# Patient Record
Sex: FEMALE | Race: WHITE | NOT HISPANIC OR LATINO | URBAN - METROPOLITAN AREA
[De-identification: names, ages, dates, MRNs, and addresses within clinical notes are randomized per-mention and may not be internally consistent; named-entity substitution may affect disease eponyms.]

---

## 2017-11-08 ENCOUNTER — AMBULATORY - RIVER FALLS (OUTPATIENT)
Dept: FAMILY MEDICINE | Facility: CLINIC | Age: 82
End: 2017-11-08

## 2017-12-06 ENCOUNTER — AMBULATORY - RIVER FALLS (OUTPATIENT)
Dept: FAMILY MEDICINE | Facility: CLINIC | Age: 82
End: 2017-12-06

## 2017-12-07 LAB
CHOLEST SERPL-MCNC: 215 MG/DL
CHOLEST/HDLC SERPL: 3.7 {RATIO}
CREAT SERPL-MCNC: 1.06 MG/DL (ref 0.6–0.88)
GLUCOSE BLD-MCNC: 93 MG/DL (ref 65–99)
HDLC SERPL-MCNC: 58 MG/DL
LDLC SERPL CALC-MCNC: 131 MG/DL
NONHDLC SERPL-MCNC: 157 MG/DL
TRIGL SERPL-MCNC: 138 MG/DL

## 2017-12-15 ENCOUNTER — OFFICE VISIT - RIVER FALLS (OUTPATIENT)
Dept: FAMILY MEDICINE | Facility: CLINIC | Age: 82
End: 2017-12-15

## 2017-12-15 ASSESSMENT — MIFFLIN-ST. JEOR: SCORE: 902.1

## 2018-10-22 ENCOUNTER — AMBULATORY - RIVER FALLS (OUTPATIENT)
Dept: FAMILY MEDICINE | Facility: CLINIC | Age: 83
End: 2018-10-22

## 2019-01-01 ENCOUNTER — AMBULATORY - RIVER FALLS (OUTPATIENT)
Dept: FAMILY MEDICINE | Facility: CLINIC | Age: 84
End: 2019-01-01

## 2019-01-03 ENCOUNTER — AMBULATORY - RIVER FALLS (OUTPATIENT)
Dept: FAMILY MEDICINE | Facility: CLINIC | Age: 84
End: 2019-01-03

## 2019-01-04 LAB
BUN SERPL-MCNC: 25 MG/DL (ref 7–25)
BUN/CREAT RATIO - HISTORICAL: 23 (ref 6–22)
CALCIUM SERPL-MCNC: 8.9 MG/DL (ref 8.6–10.4)
CHLORIDE BLD-SCNC: 103 MMOL/L (ref 98–110)
CHOLEST SERPL-MCNC: 212 MG/DL
CHOLEST/HDLC SERPL: 3.3 {RATIO}
CO2 SERPL-SCNC: 28 MMOL/L (ref 20–32)
CREAT SERPL-MCNC: 1.09 MG/DL (ref 0.6–0.88)
EGFRCR SERPLBLD CKD-EPI 2021: 44 ML/MIN/1.73M2
GLUCOSE BLD-MCNC: 87 MG/DL (ref 65–99)
HDLC SERPL-MCNC: 65 MG/DL
LDLC SERPL CALC-MCNC: 127 MG/DL
NONHDLC SERPL-MCNC: 147 MG/DL
POTASSIUM BLD-SCNC: 4.1 MMOL/L (ref 3.5–5.3)
SODIUM SERPL-SCNC: 143 MMOL/L (ref 135–146)
TRIGL SERPL-MCNC: 97 MG/DL
TSH SERPL DL<=0.005 MIU/L-ACNC: 2.07 MIU/L (ref 0.4–4.5)

## 2019-03-18 ENCOUNTER — OFFICE VISIT - RIVER FALLS (OUTPATIENT)
Dept: FAMILY MEDICINE | Facility: CLINIC | Age: 84
End: 2019-03-18

## 2019-03-18 ENCOUNTER — TRANSFERRED RECORDS (OUTPATIENT)
Dept: MULTI SPECIALTY CLINIC | Facility: CLINIC | Age: 84
End: 2019-03-18

## 2019-03-18 ASSESSMENT — MIFFLIN-ST. JEOR: SCORE: 865.81

## 2020-01-01 ENCOUNTER — OFFICE VISIT - RIVER FALLS (OUTPATIENT)
Dept: FAMILY MEDICINE | Facility: CLINIC | Age: 85
End: 2020-01-01

## 2020-01-01 ENCOUNTER — COMMUNICATION - RIVER FALLS (OUTPATIENT)
Dept: FAMILY MEDICINE | Facility: CLINIC | Age: 85
End: 2020-01-01

## 2022-02-12 VITALS
SYSTOLIC BLOOD PRESSURE: 130 MMHG | HEART RATE: 71 BPM | WEIGHT: 121 LBS | HEIGHT: 62 IN | DIASTOLIC BLOOD PRESSURE: 64 MMHG | BODY MASS INDEX: 22.26 KG/M2

## 2022-02-12 VITALS
SYSTOLIC BLOOD PRESSURE: 120 MMHG | WEIGHT: 113 LBS | HEIGHT: 62 IN | TEMPERATURE: 98.8 F | HEART RATE: 76 BPM | DIASTOLIC BLOOD PRESSURE: 62 MMHG | BODY MASS INDEX: 20.8 KG/M2

## 2022-02-16 NOTE — LETTER
(Inserted Image. Unable to display)   January 04, 2019      MAURICIO VERMA      [address]    [place], [place] [id number]        Dear MAURICIO,    Thank you for selecting [facility] (previously [facility]) for your healthcare needs.  Below you will find the results of your recent tests done at our clinic.    Results are acceptable.      Result Name Current Result Previous Result Reference Range   Sodium Level (mmol/L)  143 1/3/2019  142 12/6/2017 135 - 146   Potassium Level (mmol/L)  4.1 1/3/2019  4.1 12/6/2017 3.5 - 5.3   Chloride Level (mmol/L)  103 1/3/2019  103 12/6/2017 98 - 110   CO2 Level (mmol/L)  28 1/3/2019  30 12/6/2017 20 - 32   Glucose Level (mg/dL)  87 1/3/2019  93 12/6/2017 65 - 99   BUN (mg/dL)  25 1/3/2019  24 12/6/2017 7 - 25   Creatinine Level (mg/dL) ((H)) 1.09 1/3/2019 ((H)) 1.06 12/6/2017 0.60 - 0.88   eGFR (mL/min/1.73m2) ((L)) 44 1/3/2019 ((L)) 46 12/6/2017 > OR = 60 -    Calcium Level (mg/dL)  8.9 1/3/2019  8.9 12/6/2017 8.6 - 10.4   Cholesterol (mg/dL) ((H)) 212 1/3/2019 ((H)) 215 12/6/2017  - <200   Non-HDL Cholesterol ((H)) 147 1/3/2019 ((H)) 157 12/6/2017  - <130   HDL (mg/dL)  65 1/3/2019  58 12/6/2017 >50 -    Cholesterol/HDL Ratio  3.3 1/3/2019  3.7 12/6/2017  - <5.0   LDL ((H)) 127 1/3/2019 ((H)) 131 12/6/2017    Triglyceride (mg/dL)  97 1/3/2019  138 12/6/2017  - <150   TSH (mIU/L)  2.07 1/3/2019 ((H)) 4.53 12/6/2017 0.40 - 4.50       Please contact me or my assistant at [phone] if you have any questions.     Sincerely,        Bala Bob MD

## 2022-02-16 NOTE — LETTER
(Inserted Image. Unable to display)     July 31, 2020      MAURICIO VERMA  [address]    [place], [place] [id number]          Dear MAURICIO,      Thank you for selecting [facility] (previously [facility]) for your healthcare needs.    Our records indicate you are due for the following services:    Medicare Annual Wellness Visit.    Fasting Lab Tests ~ Please do not eat or drink anything 10 hours prior to your scheduled appointment time.  (Water and any medications that you may need are allowed unless directed otherwise.)    If you had your labs done at another facility or with Direct Access Lab Testing at [facility], please bring in a copy of the results to your next visit, mail a copy, or drop off a copy of your results to your Healthcare Provider.    You are due for lab work and an office visit; please schedule the lab appointment 1 week before the office visit.  This will assure all results are available to discuss with your Healthcare Provider during your visit.    **It is very helpful if you bring your medication bottles to your appointment.  This assures we have all of your current medications, including strength and dosing information, documented accurately in your medical record.    To schedule an appointment or if you have further questions, please contact your primary clinic:   [facility]       [phone]   Critical access hospital       [phone]              [facility]     [phone]      Powered by [employer]    Sincerely,    Bala Bob MD, FACP

## 2022-02-16 NOTE — TELEPHONE ENCOUNTER
Entered by Kimberly Dey LPN on March 08, 2019 11:55:37 AM CST  ---------------------  From: Kimberly Dey LPN   To: [facility]    Sent: 3/8/2019 11:55:37 AM CST  Subject: Medication Management     ** Submitted: **  Order:amLODIPine (amLODIPine 5 mg oral tablet)  1 tab(s)  Oral  daily  Qty:  14 tab(s)        Refills:  0          Substitutions Allowed     Route To Pharmacy - [facility]    Signed by Kimberly Dey LPN  3/8/2019 11:55:00 AM    ** Submitted: **  Complete:amLODIPine (amLODIPine 5 mg oral tablet)   Signed by Kimberly Dey LPN  3/8/2019 11:55:00 AM    ** Not Approved:  **  amLODIPine (AMLODIPINE 5MG TABLET)  TAKE 1 TABLET BY MOUTH ONCE DAILY -MD APPT NEEDED FOR FURTHER REFILLS 1/30/19  Qty:  30 unknown unit        Days Supply:  0        Refills:  0          Substitutions Allowed     Route To Pharmacy - [facility]   Signed by Kimberly Dey LPN            ** Submitted: **  Order:atenolol (atenolol 100 mg oral tablet)  1 tab(s)  Oral  daily  Qty:  14 tab(s)        Refills:  0          Substitutions Allowed     Route To Pharmacy - [facility]    Signed by Kimberly Dey LPN  3/8/2019 11:54:00 AM    ** Submitted: **  Complete:atenolol (atenolol 100 mg oral tablet)   Signed by Kimberly Dey LPN  3/8/2019 11:55:00 AM    ** Not Approved:  **  atenolol (ATENOLOL     TAB 100MG TABLET)  TAKE 1 TABLET BY MOUTH ONCE DAILY -MD APPT NEEDED FOR FURTHER REFILLS 1/30/19  Qty:  30 unknown unit        Days Supply:  0        Refills:  0          Substitutions Allowed     Route To Pharmacy - [facility]   Signed by Kimberly Dey LPN            ** Submitted: **  Order:levothyroxine (levothyroxine 88 mcg (0.088 mg) oral tablet)  1 tab(s)  Oral  daily  Qty:  14 tab(s)        Refills:  0          Substitutions Allowed     Route To Pharmacy - [facility]    Signed by Kimberly Dey LPN  3/8/2019 11:54:00 AM    ** Submitted: **  Complete:levothyroxine (levothyroxine 88 mcg (0.088 mg) oral tablet)   Signed by Kimberly Dey LPN  3/8/2019  11:54:00 AM    ** Not Approved:  **  levothyroxine (LEVOTHYROXINE TAB 88MCG TABLET)  TAKE 1 TABLET BY MOUTH ONCE DAILY -MD APPT NEEDED FOR FURTHER REFILLS 1/30/19  Qty:  30 unknown unit        Days Supply:  0        Refills:  0          Substitutions Allowed     Route To Pharmacy - [facility]   Signed by Kimberly Dey LPN            ** Submitted: **  Order:hydrochlorothiazide-losartan (hydrochlorothiazide-losartan 25 mg-100 mg oral tablet)  1 tab(s)  Oral  daily  Qty:  14 tab(s)        Refills:  0          Substitutions Allowed     Route To Pharmacy - [facility]    Signed by Kimberly Dey LPN  3/8/2019 11:53:00 AM    ** Submitted: **  Complete:hydrochlorothiazide-losartan (hydrochlorothiazide-losartan 25 mg-100 mg oral tablet)   Signed by Kimberly Dey LPN  3/8/2019 11:53:00 AM    ** Not Approved:  **  hydrochlorothiazide-losartan (LOSARTAN/HCTZ 100MG/25MG /25 TABLET)  TAKE 1 TABLET BY MOUTH ONCE DAILY -MD APPT NEEDED FOR FURTHER REFILLS 1/30/19  Qty:  30 unknown unit        Days Supply:  0        Refills:  0          Substitutions Allowed     Route To Pharmacy - [facility]   Signed by Kimberly Dey LPN          Received VM from [employer] at 11:38am. They said pt is having trouble getting her refills and they have been sending requests. They ask that we call pt at [phone] if she is needing an appt.    Called pt and let her know that she needs to make appt to see [facility]. Pt just had fasting labs done in January. Pt ok with being transferred to scheduling to make appt.    Pt informed 2 week supply will be sent in to get her through until that appt.      ** Patient matched by Kimberly Dey LPN on 3/8/2019 11:48:51 AM CST **      ------------------------------------------  From: [facility]  To: Bala Bob MD  Sent: March 8, 2019 11:30:22 AM CST  Subject: Medication Management  Due: March 9, 2019 11:30:22 AM CST    ** On Hold Pending Signature **  Drug: hydrochlorothiazide-losartan  (hydrochlorothiazide-losartan 25 mg-100 mg oral tablet)  TAKE 1 TABLET BY MOUTH ONCE DAILY -MD APPT NEEDED FOR FURTHER REFILLS 1/30/19  Quantity: 30 unknown unit  Days Supply: 0         Refills: 0  Substitutions Allowed  Notes from Pharmacy:     Dispensed Drug: hydrochlorothiazide-losartan (hydrochlorothiazide-losartan 25 mg-100 mg oral tablet)  TAKE 1 TABLET BY MOUTH ONCE DAILY -MD APPT NEEDED FOR FURTHER REFILLS 1/30/19  Quantity: 30 unknown unit  Days Supply: 0         Refills: 0  Substitutions Allowed  Notes from Pharmacy:     ** On Hold Pending Signature **  Drug: levothyroxine (levothyroxine 88 mcg (0.088 mg) oral tablet)  TAKE 1 TABLET BY MOUTH ONCE DAILY -MD APPT NEEDED FOR FURTHER REFILLS 1/30/19  Quantity: 30 unknown unit  Days Supply: 0         Refills: 0  Substitutions Allowed  Notes from Pharmacy:     Dispensed Drug: levothyroxine (levothyroxine 88 mcg (0.088 mg) oral tablet)  TAKE 1 TABLET BY MOUTH ONCE DAILY -MD APPT NEEDED FOR FURTHER REFILLS 1/30/19  Quantity: 30 unknown unit  Days Supply: 0         Refills: 0  Substitutions Allowed  Notes from Pharmacy:     ** On Hold Pending Signature **  Drug: atenolol (atenolol 100 mg oral tablet)  TAKE 1 TABLET BY MOUTH ONCE DAILY -MD APPT NEEDED FOR FURTHER REFILLS 1/30/19  Quantity: 30 unknown unit  Days Supply: 0         Refills: 0  Substitutions Allowed  Notes from Pharmacy:     Dispensed Drug: atenolol (atenolol 100 mg oral tablet)  TAKE 1 TABLET BY MOUTH ONCE DAILY -MD APPT NEEDED FOR FURTHER REFILLS 1/30/19  Quantity: 30 unknown unit  Days Supply: 0         Refills: 0  Substitutions Allowed  Notes from Pharmacy:     ** On Hold Pending Signature **  Drug: amLODIPine (amLODIPine 5 mg oral tablet)  TAKE 1 TABLET BY MOUTH ONCE DAILY -MD APPT NEEDED FOR FURTHER REFILLS 1/30/19  Quantity: 30 unknown unit  Days Supply: 0         Refills: 0  Substitutions Allowed  Notes from Pharmacy:     Dispensed Drug: amLODIPine (amLODIPine 5 mg oral tablet)  TAKE 1 TABLET BY  MOUTH ONCE DAILY -MD APPT NEEDED FOR FURTHER REFILLS 1/30/19  Quantity: 30 unknown unit  Days Supply: 0         Refills: 0  Substitutions Allowed  Notes from Pharmacy:   ------------------------------------------

## 2022-02-16 NOTE — NURSING NOTE
CAGE Assessment Entered On:  3/18/2019 2:15 PM CDT    Performed On:  3/18/2019 2:15 PM CDT by Ju Mao MA               Assessment   Have you ever felt you should cut down on your drinking :   No   Have people annoyed you by criticizing your drinking :   No   Have you ever felt bad or guilty about your drinking :   No   Have you ever taken a drink first thing in the morning to steady your nerves or get rid of a hangover (Eye-opener) :   No   CAGE Score :   0    Ju Mao MA - 3/18/2019 2:15 PM CDT

## 2022-02-16 NOTE — PROGRESS NOTES
Patient:   MAURICIO VERMA            MRN: [id number]            FIN: [id number]               Age:   89 years     Sex:  Female     :  1927   Associated Diagnoses:   Acquired hypothyroidism; Hip joint pain; HLD (hyperlipidemia); HTN (hypertension); Lower extremity edema; Osteoporosis; Wellness examination   Author:   Bala Bob MD      Visit Information   Visit type:  Annual exam.    Accompanied by:  Family member.    Source of history:  Self.    History limitation:  None.       Chief Complaint   3/18/2019 2:15 PM CDT    AWV      History of Present Illness   The patient is a 89 year old here for a wellness visit.  She lives in her own apartment with her cat and is overall happy with life.  She needs her medications refilled.    On the Medicare Health Risk Assessment form she has not fallen and she does have some rugs and we have discussed this issue.    On complete review she indicates back pain and it is otherwise negative.    GDS short form is 0.    We again discussed osteoporosis and she declines treatment.         Review of Systems   See [facility]       Health Status   Allergies:    Allergic Reactions (Selected)  No known allergies   Medications:  (Selected)   Prescriptions  Prescribed  amLODIPine 5 mg oral tablet: = 1 tab(s), Oral, daily, # 90 tab(s), 3 Refill(s), Type: Maintenance, Pharmacy: [facility], Needs appt, 1 tab(s) Oral daily  atenolol 100 mg oral tablet: = 1 tab(s), Oral, daily, # 90 tab(s), 3 Refill(s), Type: Maintenance, Pharmacy: [facility], Needs appt, 1 tab(s) Oral daily  hydrochlorothiazide-losartan 25 mg-100 mg oral tablet: 1 tab(s), Oral, daily, # 90 tab(s), 3 Refill(s), Type: Maintenance, Pharmacy: [facility], Needs appt, 1 tab(s) Oral daily  levothyroxine 88 mcg (0.088 mg) oral tablet: = 1 tab(s), Oral, daily, # 90 tab(s), 3 Refill(s), Type: Maintenance, Pharmacy: [facility], Needs appt, 1 tab(s) Oral daily  trach-mate tube duarte: trach-mate tube duarte, See Instructions,  Instructions: Daily as needed, Supply, # 100 EA, 3 Refill(s), Type: Maintenance, Pharmacy: [facility], Daily as needed   Problem list:    All Problems  Acquired hypothyroidism / SNOMED CT 574576911 / Confirmed  Breast cancer / SNOMED CT 966227049 / Confirmed  Chronic back pain / SNOMED CT 631526211 / Confirmed  H/O varicose veins / SNOMED CT 988759280 / Confirmed  Hip joint pain / SNOMED CT 52144660 / Confirmed  HLD (hyperlipidemia) / SNOMED CT 80146407 / Confirmed  HTN (hypertension) / SNOMED CT 3874988826 / Confirmed  Hx of colonic polyps / SNOMED CT 2625953275 / Confirmed  Left ventricular dysfunction with reduced left ventricular function / SNOMED CT 040598302 / Confirmed  Lower extremity edema / SNOMED CT 290723655 / Confirmed  Meningioma / SNOMED CT 265555987 / Confirmed  Menopause / SNOMED CT 901283754 / Confirmed  Nodular goiter / SNOMED CT 6246038083 / Confirmed  Osteoporosis / SNOMED CT 235662112 / Confirmed  Paralysis of vocal cords or larynx, unspecified / SNOMED CT 877118512 / Confirmed  RA (rheumatoid arthritis) / SNOMED CT 368835214 / Confirmed  Rhinitis, chronic / SNOMED CT 592642320 / Confirmed  Inactive: Hyperparathyroidism / SNOMED CT 886593454  Canceled: CAD (Coronary Artery Disease) / ICD-9-.00      Histories   Past Medical History:    Active  Breast cancer (321267978): Onset in the month of 10/1994 at 67 years  Meningioma (615993944): Onset in 1980 at 53 years.  RA (rheumatoid arthritis) (802200304)  HTN (hypertension) (4823932143)  Menopause (066214511)  HLD (hyperlipidemia) (64971519)  Osteoporosis (564022860)  Hx of colonic polyps (0498689864)  H/O varicose veins (685473058)  Nodular goiter (5825969745)  Paralysis of vocal cords or larynx, unspecified (760615027)  Chronic back pain (358955445)  Hip joint pain (37493438)  Lower extremity edema (665186294)  Comments:  8/10/2010 CDT 5:09 PM CDT - Hodan Reynaga  chronic  Acquired hypothyroidism (773175631)  Rhinitis, chronic  (303394929)  Left ventricular dysfunction with reduced left ventricular function (220366885)   Family History:    Heart disease  Father ()  Pancreatic cancer  Mother ()  Alcohol abuse  Father ()     Procedure history:    Normal Coronary Angiogram on 2008 at 80 Years.  Tracheostomy (96318243) in the month of 10/2006 at 79 Years.  Comments:  8/10/2010 5:07 PM Hodan Allison  following complications of thyroid surgery and vocal cord paralysis.  Total thyroidectomy (22417027) in  at 78 Years.  Comments:  8/10/2010 5:03 PM Hodan Allison  with excision of left parathyroid adenoma  Colonoscopy (623493186) in  at 73 Years.  Colonoscopy (384571105) in the month of 1998 at 70 Years.  Comments:  8/10/2010 5:01 PM Hodan Allison  with polypectomy - adenomatous  Excision of lesion of breast (7768583198) in  at 67 Years.  Comments:  8/10/2010 5:13 PM Hodan Allison  Left.  Meningioma (98802983) in  at 53 Years.  Appendectomy (598784802) in  at 50 Years.  Comments:  8/10/2010 4:59 PM Hodan Allison  ruptured appendix  Tonsillectomy (307310022) in 1933 at 6 Years.  Mastectomy (6975894499).  Comments:  8/10/2010 5:14 PM Hodan Allison  left   Social History:        Alcohol Assessment            Never      Tobacco Assessment            Never      Employment and Education Assessment            Retired      Home and Environment Assessment            Marital status: .                     Comments:                      2014 - Bala Bob MD                      2014      Physical Examination   Vital Signs   3/18/2019 2:35 PM CDT Systolic Blood Pressure 120 mmHg    Diastolic Blood Pressure 62 mmHg    Mean Arterial Pressure 81 mmHg    BP Site Right arm    BP Method Manual   3/18/2019 2:15 PM CDT Temperature Tympanic 98.8 DegF    Peripheral Pulse Rate 76 bpm    Pulse Site Radial artery    HR Method Manual    Systolic Blood Pressure  151 mmHg  HI    Diastolic Blood Pressure 75 mmHg    Mean Arterial Pressure 100 mmHg    BP Site Right arm    BP Method Manual      Measurements from flowsheet : Measurements   3/18/2019 2:15 PM CDT Height Measured - Standard 62 in    Weight Measured - Standard 113 lb    BSA 1.5 m2    Body Mass Index 20.67 kg/m2      General:  Alert and oriented, No acute distress.    Eye:  Normal conjunctiva.    HENT:  Normocephalic, Oral mucosa is moist, No pharyngeal erythema, hearing aides.    Neck:  Supple, Non-tender, No carotid bruit, No lymphadenopathy, No thyromegaly, Trach.    Respiratory:  Lungs are clear to auscultation, Respirations are non-labored.    Cardiovascular:  Normal rate, Regular rhythm, No murmur, No gallop, Normal peripheral perfusion, No pitting edema.    Gastrointestinal:  Soft, Non-tender.    Musculoskeletal:  No deformity, Normal gait.    Integumentary:  No pallor.    Neurologic:  No focal deficits.    Cognition and Speech:  Speech clear and coherent, Functional cognition intact.    Psychiatric:  Cooperative, Appropriate mood & affect.       Review / Management   Results review:  Lab results   1/3/2019 8:57 AM CST Sodium Level 143 mmol/L    Potassium Level 4.1 mmol/L    Chloride Level 103 mmol/L    CO2 Level 28 mmol/L    Glucose Level 87 mg/dL    BUN 25 mg/dL    Creatinine Level 1.09 mg/dL  HI    BUN/Creat Ratio 23  HI    eGFR 44 mL/min/1.73m2  LOW    eGFR African American 51 mL/min/1.73m2  LOW    Calcium Level 8.9 mg/dL    Cholesterol 212 mg/dL  HI    Non-HDL Cholesterol 147  HI    HDL 65 mg/dL    Cholesterol/HDL Ratio 3.3      HI    Triglyceride 97 mg/dL    TSH 2.07 mIU/L   .       Impression and Plan   Diagnosis     Acquired hypothyroidism (IOE28-SA E03.9).     Hip joint pain (IUH41-IA M25.559).     HLD (hyperlipidemia) (MBU74-VO E78.5).     HTN (hypertension) (BGR07-RX I10).     Lower extremity edema (XUB95-AF R60.0).     Osteoporosis (CXF13-IM M81.0).     Wellness examination (LZK65-VK Z00.00).      Course:  Progressing as expected.    Patient Instructions:       Counseled: Patient, Diet, Activity, Verbalized understanding.    Orders     Orders (Selected)   Outpatient Orders  Ordered  Return to Clinic (Request): RFV: Annual Exam, Return in March 2020, Instructions: BMP and TSH before visit  Prescriptions  Prescribed  amLODIPine 5 mg oral tablet: = 1 tab(s), Oral, daily, # 90 tab(s), 3 Refill(s), Type: Maintenance, Pharmacy: [facility], Needs appt, 1 tab(s) Oral daily  atenolol 100 mg oral tablet: = 1 tab(s), Oral, daily, # 90 tab(s), 3 Refill(s), Type: Maintenance, Pharmacy: [facility], Needs appt, 1 tab(s) Oral daily  hydrochlorothiazide-losartan 25 mg-100 mg oral tablet: 1 tab(s), Oral, daily, # 90 tab(s), 3 Refill(s), Type: Maintenance, Pharmacy: [facility], Needs appt, 1 tab(s) Oral daily  levothyroxine 88 mcg (0.088 mg) oral tablet: = 1 tab(s), Oral, daily, # 90 tab(s), 3 Refill(s), Type: Maintenance, Pharmacy: [facility], Needs appt, 1 tab(s) Oral daily  trach-mate tube duarte: trach-mate tube duarte, See Instructions, Instructions: Daily as needed, Supply, # 100 EA, 3 Refill(s), Type: Maintenance, Pharmacy: [facility], Daily as needed.

## 2022-02-16 NOTE — NURSING NOTE
Quick Intake Entered On:  3/18/2019 2:36 PM CDT    Performed On:  3/18/2019 2:35 PM CDT by Bala Bob MD               Summary   Systolic Blood Pressure :   120 mmHg   Diastolic Blood Pressure :   62 mmHg   Mean Arterial Pressure :   81 mmHg   BP Site :   Right arm   BP Method :   Manual   Bala Bob MD - 3/18/2019 2:35 PM CDT

## 2022-02-16 NOTE — NURSING NOTE
Medicare Visit Entered On:  3/18/2019 2:22 PM CDT    Performed On:  3/18/2019 2:15 PM CDT by Mayco DUNCAN, Ju               Summary   Chief Complaint :   AWV   Weight Measured :   113 lb(Converted to: 113 lb 0 oz, 51.26 kg)    Height Measured :   62 in(Converted to: 5 ft 2 in, 157.48 cm)    Body Mass Index :   20.67 kg/m2   Body Surface Area :   1.5 m2   Systolic Blood Pressure :   151 mmHg (HI)    Diastolic Blood Pressure :   75 mmHg   Mean Arterial Pressure :   100 mmHg   Peripheral Pulse Rate :   76 bpm   BP Site :   Right arm   Pulse Site :   Radial artery   BP Method :   Manual   HR Method :   Manual   Temperature Tympanic :   98.8 DegF(Converted to: 37.1 DegC)    Ju Mao MA - 3/18/2019 2:15 PM CDT   Health Status   Allergies Verified? :   Yes   Medication History Verified? :   Yes   Medical History Verified? :   Yes   Pre-Visit Planning Status :   Completed   Tobacco Use? :   Never smoker   Ju Mao MA 3/18/2019 2:15 PM CDT   Demographics   Last Name :   Munir   Address :   [address] [place] [address]   First Name :   Loyda Rosas Initial :   NENA   Responsible Party Date of Birth () :   1927 CDT   City :   [place]   State :   [place]   Zip Code :   [address]   Ju Mao MA - 3/18/2019 2:15 PM CDT   Consents   Consent for Immunization Exchange :   Consent Granted   Consent for Immunizations to Providers :   Consent Granted   Ju Mao MA 3/18/2019 2:15 PM CDT   Meds / Allergies   (As Of: 3/18/2019 2:22:28 PM CDT)   Allergies (Active)   No known allergies  Estimated Onset Date:   Unspecified ; Created By:   Hodan Reynaga; Reaction Status:   Active ; Category:   Drug ; Substance:   No known allergies ; Type:   Allergy ; Updated By:   Hodan Reynaga; Reviewed Date:   2015 8:05 PM CST        Medication List   (As Of: 3/18/2019 2:22:28 PM CDT)   Prescription/Discharge Order    amLODIPine  :   amLODIPine ; Status:   Prescribed ; Ordered As Mnemonic:   amLODIPine 5 mg oral tablet  ; Simple Display Line:   1 tab(s), Oral, daily, 14 tab(s), 0 Refill(s) ; Ordering Provider:   Bala Bob MD; Catalog Code:   amLODIPine ; Order Dt/Tm:   3/8/2019 11:55:15 AM          atenolol  :   atenolol ; Status:   Prescribed ; Ordered As Mnemonic:   atenolol 100 mg oral tablet ; Simple Display Line:   1 tab(s), Oral, daily, 14 tab(s), 0 Refill(s) ; Ordering Provider:   Bala Bob MD; Catalog Code:   atenolol ; Order Dt/Tm:   3/8/2019 11:54:44 AM          hydrochlorothiazide-losartan  :   hydrochlorothiazide-losartan ; Status:   Prescribed ; Ordered As Mnemonic:   hydrochlorothiazide-losartan 25 mg-100 mg oral tablet ; Simple Display Line:   1 tab(s), Oral, daily, 14 tab(s), 0 Refill(s) ; Ordering Provider:   Bala Bob MD; Catalog Code:   hydrochlorothiazide-losartan ; Order Dt/Tm:   3/8/2019 11:53:23 AM          levothyroxine  :   levothyroxine ; Status:   Prescribed ; Ordered As Mnemonic:   levothyroxine 88 mcg (0.088 mg) oral tablet ; Simple Display Line:   1 tab(s), Oral, daily, 14 tab(s), 0 Refill(s) ; Ordering Provider:   Bala Bob MD; Catalog Code:   levothyroxine ; Order Dt/Tm:   3/8/2019 11:54:06 AM          Miscellaneous Rx Supply  :   Miscellaneous Rx Supply ; Status:   Prescribed ; Ordered As Mnemonic:   trach-mate tube duarte ; Simple Display Line:   See Instructions, Daily as needed, 100 EA, 3 Refill(s) ; Ordering Provider:   Bala Bob MD; Catalog Code:   Miscellaneous Rx Supply ; Order Dt/Tm:   11/24/2015 11:16:23 AM            Social History   Social History   (As Of: 3/18/2019 2:22:28 PM CDT)   Alcohol:        Never   (Last Updated: 12/19/2017 11:36:35 AM CST by Serene Medina)          Tobacco:        Never   (Last Updated: 9/22/2014 1:50:28 PM CDT by Bala Bob MD)          Employment and Education:        Retired   (Last Updated: 9/22/2014 1:50:28 PM CDT by Bala Bob MD)          Home and Environment:        Marital status: .   Comments:  9/22/2014  1:50 PM - Bala Bob MD:  9/2014   (Last Updated: 9/22/2014 1:50:28 PM CDT by Bala Bob MD)            Geriatric Depression Screening   Geriatric Depression Satisfied Life :   Yes   Geriatric Depression Dropped Activities :   No   Geriatric Depression Life Empty :   No   Geriatric Depression Bored :   No   Geriatric Depression Good Spirits :   Yes   Geriatric Depression Afraid Bad Things :   No   Geriatric Depression Feel Happy :   Yes   Geriatric Depression Feel Helpless :   No   Geriatric Depression Prefer to Stay Home :   No   Geriatric Depression Memory Problems :   No   Geriatric Depression Wonderful Be Alive :   Yes   Geriatric Depression Feel Worthless :   No   Geriatric Depression Situation Hopeless :   No   Geriatric Depression Others Better Off :   No   Geriatric Depression Full of Energy :   Yes   Geriatric Depression Total Score :   0    Ju Mao MA - 3/18/2019 2:15 PM CDT   Home Safety Screen   Emergency Numbers Kept/Updated :   Yes   Aware of Smoking Dangers :   Yes   Smoke Alarms/Fire Extinguisher Available :   Yes   Household Members Fire Safety Knowledge :   Yes   Firearms Unloaded and Secure :   Yes   Floor Rugs Removed or Fastened :   No   Mats in Bathtub/Shower :   Yes   Stairway Rails or Banisters :   Yes   Outdoor Clutter Safety :   Yes   Indoor Clutter Safety :   Yes   Electrical Cord Safety :   Yes   Ju Mao MA - 3/18/2019 2:15 PM CDT   West Fall Risk   History of Fall in Last 3 Months West :   No   Ju Mao MA - 3/18/2019 2:15 PM CDT   Functional Assessment   Focused Functional Assessment Grid   Bathing :   Independent (2)   Dressing :   Independent (2)   Toileting :   Independent (2)   Transferring Bed or Chair :   Independent (2)   Feeding :   Independent (2)   Ju Mao MA - 3/18/2019 2:15 PM CDT   Capable of Shopping :   Yes   Capable of Walking :   Yes   Capable of Housekeeping :   Yes   Capable of Managing Medications :   Yes   Capable of  Handling Finances :   Yes   Mayco DUNCAN, Ju - 3/18/2019 2:15 PM CDT

## 2022-02-16 NOTE — TELEPHONE ENCOUNTER
Entered by Bárbara Norwood CMA on January 30, 2019 12:08:09 PM CST  ---------------------  From: Bárbara Norwood CMA   To: [facility]    Sent: 1/30/2019 12:08:09 PM CST  Subject: Medication Management     ** Submitted: **  Order:amLODIPine (amLODIPine 5 mg oral tablet)  1 tab(s)  Oral  daily  Qty:  30 tab(s)        Refills:  0          Substitutions Allowed     Route To Pharmacy - [facility]    Signed by Bárbara Norwood CMA  1/30/2019 12:07:00 PM    ** Submitted: **  Complete:amLODIPine (amLODIPine 5 mg oral tablet)   Signed by Bárbara Norwood CMA  1/30/2019 12:08:00 PM    ** Not Approved:  **  amLODIPine (AMLODIPINE 5MG TABLET)  TAKE ONE TABLET BY MOUTH ONCE DAILY  Qty:  30 unknown unit        Days Supply:  0        Refills:  0          MIMI     Route To Pharmacy - [facility]   Signed by Bárbara Norwood CMA            ** Submitted: **  Order:atenolol (atenolol 100 mg oral tablet)  1 tab(s)  Oral  daily  Qty:  30 tab(s)        Refills:  0          Substitutions Allowed     Route To Pharmacy - [facility]    Signed by Bárbara Norwood CMA  1/30/2019 12:07:00 PM    ** Submitted: **  Complete:atenolol (atenolol 100 mg oral tablet)   Signed by Bárbara Norwood CMA  1/30/2019 12:07:00 PM    ** Not Approved:  **  atenolol (ATENOLOL     TAB 100MG TABLET)  TAKE ONE TABLET BY MOUTH ONCE DAILY  Qty:  30 unknown unit        Days Supply:  0        Refills:  0          MIMI     Route To Pharmacy - [facility]   Signed by Bárbara Norwood CMA            ** Submitted: **  Order:hydrochlorothiazide-losartan (hydrochlorothiazide-losartan 25 mg-100 mg oral tablet)  1 tab(s)  Oral  daily  Qty:  30 tab(s)        Refills:  0          Substitutions Allowed     Route To Pharmacy - [facility]    Signed by Bárbara Norwood CMA  1/30/2019 12:07:00 PM    ** Submitted: **  Complete:hydrochlorothiazide-losartan (hydrochlorothiazide-losartan 25 mg-100 mg oral tablet)   Signed by Bárbara Norwood CMA  1/30/2019 12:07:00 PM    ** Not Approved:   **  hydrochlorothiazide-losartan (LOSARTAN/HCTZ 100MG/25MG /25 TABLET)  TAKE ONE TABLET BY MOUTH ONCE DAILY  Qty:  30 unknown unit        Days Supply:  0        Refills:  0          MIMI     Route To Pharmacy [facility]   Signed by Bárbara Norwood CMA            ** Submitted: **  Order:levothyroxine (levothyroxine 88 mcg (0.088 mg) oral tablet)  1 tab(s)  Oral  daily  Qty:  30 tab(s)        Refills:  0          Substitutions Allowed     Route To [facility]    Signed by Bárbara Nrowood CMA  1/30/2019 12:06:00 PM    ** Submitted: **  Complete:levothyroxine (levothyroxine 88 mcg (0.088 mg) oral tablet)   Signed by Bárbara Norwood CMA  1/30/2019 12:07:00 PM    ** Not Approved:  **  levothyroxine (LEVOTHYROXINE TAB 88MCG TABLET)  TAKE ONE TABLET BY MOUTH ONCE DAILY  Qty:  30 unknown unit        Days Supply:  0        Refills:  0          MIMI     Route To [facility]   Signed by Bárbara Norwood CMA            ** Patient matched by Bárbara Norwood CMA on 1/30/2019 12:04:34 PM CST **      ------------------------------------------  From: [facility]  To: Bala Bob MD  Sent: January 30, 2019 10:56:24 AM CST  Subject: Medication Management  Due: January 31, 2019 10:56:24 AM CST    ** On Hold Pending Signature **  Drug: amLODIPine (amLODIPine 5 mg oral tablet)  TAKE ONE TABLET BY MOUTH ONCE DAILY  Quantity: 30 unknown unit  Days Supply: 0         Refills: 0  Substitutions Allowed  Notes from Pharmacy:     Dispensed Drug: amLODIPine (amLODIPine 5 mg oral tablet)  TAKE ONE TABLET BY MOUTH ONCE DAILY  Quantity: 30 unknown unit  Days Supply: 0         Refills: 0  Substitutions Allowed  Notes from Pharmacy:     ** On Hold Pending Signature **  Drug: hydrochlorothiazide-losartan (hydrochlorothiazide-losartan 25 mg-100 mg oral tablet)  TAKE ONE TABLET BY MOUTH ONCE DAILY  Quantity: 30 unknown unit  Days Supply: 0         Refills: 0  Substitutions Allowed  Notes from Pharmacy:     Dispensed Drug:  hydrochlorothiazide-losartan (hydrochlorothiazide-losartan 25 mg-100 mg oral tablet)  TAKE ONE TABLET BY MOUTH ONCE DAILY  Quantity: 30 unknown unit  Days Supply: 0         Refills: 0  Substitutions Allowed  Notes from Pharmacy:     ** On Hold Pending Signature **  Drug: atenolol (atenolol 100 mg oral tablet)  TAKE ONE TABLET BY MOUTH ONCE DAILY  Quantity: 30 unknown unit  Days Supply: 0         Refills: 0  Substitutions Allowed  Notes from Pharmacy:     Dispensed Drug: atenolol (atenolol 100 mg oral tablet)  TAKE ONE TABLET BY MOUTH ONCE DAILY  Quantity: 30 unknown unit  Days Supply: 0         Refills: 0  Substitutions Allowed  Notes from Pharmacy:     ** On Hold Pending Signature **  Drug: levothyroxine (levothyroxine 88 mcg (0.088 mg) oral tablet)  TAKE ONE TABLET BY MOUTH ONCE DAILY  Quantity: 30 unknown unit  Days Supply: 0         Refills: 0  Substitutions Allowed  Notes from Pharmacy:     Dispensed Drug: levothyroxine (levothyroxine 88 mcg (0.088 mg) oral tablet)  TAKE ONE TABLET BY MOUTH ONCE DAILY  Quantity: 30 unknown unit  Days Supply: 0         Refills: 0  Substitutions Allowed  Notes from Pharmacy:   ------------------------------------------Med Refill      Date of last office visit and reason:  12/15/17; annual       Date of last Med Check / Px:   12/15/17  Date of last labs pertaining to med:  1/3/19    RTC order in chart:  yes    For Protocol refill, has patient been contacted:  msg sent to pharmacy

## 2022-02-16 NOTE — TELEPHONE ENCOUNTER
MAURICIO VERMA : 1927  10/04/2020 MRN: [id number]  AFTER HOURS PHONE NOTE: [phone]  Time Paged: 4:22 p.m.   Time Call Retuned: 4:24 p.m.   S: I spoke with the nurse at [facility] and [facility].  The patient was noted to have passed away.    P: Orders are given to release the body to the  home.    D:  10/04/2020  T:  10/05/2020                                                                                      Manish Serra MD/sq

## 2022-02-16 NOTE — TELEPHONE ENCOUNTER
Entered by Bárbara Norwood CMA on January 02, 2019 11:28:44 AM CST  ---------------------  From: Bárbara Norwood CMA   To: [facility]    Sent: 1/2/2019 11:28:44 AM CST  Subject: Medication Management     ** Not Approved: Request responded to by other means **  levothyroxine (LEVOTHYROXINE TAB 88MCG TABLET)  TAKE ONE TABLET BY MOUTH ONCE DAILY  Qty:  90 unknown unit        Days Supply:  0        Refills:  0          MIMI     Route To Pharmacy - [facility]   Signed by Bárbara Norwood CMA            ** Patient matched by Bárbara Norwood CMA on 1/2/2019 11:28:17 AM LUISITO **      ------------------------------------------  From: [facility]  To: Bala Bob MD  Sent: January 2, 2019 11:21:02 AM LUISITO  Subject: Medication Management  Due: January 3, 2019 11:21:02 AM CST    ** On Hold Pending Signature **  Drug: levothyroxine (levothyroxine 88 mcg (0.088 mg) oral tablet)  TAKE ONE TABLET BY MOUTH ONCE DAILY  Quantity: 90 unknown unit  Days Supply: 0         Refills: 0  Substitutions Allowed  Notes from Pharmacy:     Dispensed Drug: levothyroxine (levothyroxine 88 mcg (0.088 mg) oral tablet)  TAKE ONE TABLET BY MOUTH ONCE DAILY  Quantity: 90 unknown unit  Days Supply: 0         Refills: 0  Substitutions Allowed  Notes from Pharmacy:   ------------------------------------------

## 2022-02-16 NOTE — TELEPHONE ENCOUNTER
Entered by Cely Castellano CMA on March 23, 2020 2:42:19 PM CDT  ---------------------  From: Cely Castellano CMA   To: [facility]    Sent: 3/23/2020 2:42:19 PM CDT  Subject: Medication Management     ** Submitted: **  Order:losartan (losartan 100 mg oral tablet)  1 tab(s)  Oral  daily  Qty:  90 unknown unit        Days Supply:  0        Refills:  0          Substitutions Allowed     Route To Pharmacy - [facility]    Signed by Cely Castellano CMA  3/23/2020 7:42:00 PM    ** Not Approved:  **  losartan (LOSARTAN POT TAB 100MG TABLET)  TAKE 1 TABLET BY MOUTH ONCE DAILY  Qty:  90 unknown unit        Days Supply:  0        Refills:  0          Substitutions Allowed     Route To Pharmacy - [facility]   Signed by Cely Castellano CMA            ** Submitted: **  Order:levothyroxine (levothyroxine 88 mcg (0.088 mg) oral tablet)  1 tab(s)  Oral  daily  Qty:  90 unknown unit        Days Supply:  0        Refills:  0          Substitutions Allowed     Route To Pharmacy - [facility]    Signed by Cely Castellano CMA  3/23/2020 7:41:00 PM    ** Submitted: **  Complete:levothyroxine (levothyroxine 88 mcg (0.088 mg) oral tablet)   Signed by Cely Castellano CMA  3/23/2020 7:42:00 PM    ** Not Approved:  **  levothyroxine (LEVOTHYROXINE TAB 88MCG TABLET)  TAKE 1 TABLET BY MOUTH ONCE DAILY  Qty:  90 unknown unit        Days Supply:  0        Refills:  0          Substitutions Allowed     Route To Pharmacy - [facility]   Signed by Cely Castellano CMA            ** Submitted: **  Order:hydroCHLOROthiazide (hydroCHLOROthiazide 25 mg oral tablet)  1 tab(s)  Oral  daily  Qty:  90 unknown unit        Days Supply:  0        Refills:  0          Substitutions Allowed     Route To Pharmacy - [facility]    Signed by Cely Castellano CMA  3/23/2020 7:41:00 PM    ** Not Approved:  **  hydroCHLOROthiazide (HYDROCHLOROTHIAZIDE 25MG TABLET)  TAKE 1 TABLET BY MOUTH ONCE DAILY  Qty:  90 unknown unit         Days Supply:  0        Refills:  0          Substitutions Allowed     Route To Pharmacy - [facility]   Signed by Cely Castellano CMA            ** Submitted: **  Order:atenolol (atenolol 100 mg oral tablet)  1 tab(s)  Oral  daily  Qty:  90 unknown unit        Days Supply:  0        Refills:  0          Substitutions Allowed     Route To Pharmacy - [facility]    Signed by Cely Castellano CMA  3/23/2020 7:41:00 PM    ** Submitted: **  Complete:atenolol (atenolol 100 mg oral tablet)   Signed by Cely Castellano CMA  3/23/2020 7:41:00 PM    ** Not Approved:  **  atenolol (ATENOLOL  100 MG  TAB TABLET)  TAKE 1 TABLET BY MOUTH ONCE DAILY  Qty:  90 unknown unit        Days Supply:  0        Refills:  0          Substitutions Allowed     Route To Pharmacy - [facility]   Signed by Cely Castellano CMA            ** Submitted: **  Order:amLODIPine (amLODIPine 5 mg oral tablet)  1 tab(s)  Oral  daily  Qty:  90 unknown unit        Days Supply:  0        Refills:  0          Substitutions Allowed     Route To Pharmacy - [facility]    Signed by Cely Castellano CMA  3/23/2020 7:41:00 PM    ** Submitted: **  Complete:amLODIPine (amLODIPine 5 mg oral tablet)   Signed by Cely Castellano CMA  3/23/2020 7:41:00 PM    ** Not Approved:  **  amLODIPine (AMLODIPINE   TAB 5MG TABLET)  TAKE 1 TABLET BY MOUTH ONCE DAILY  Qty:  90 unknown unit        Days Supply:  0        Refills:  0          Substitutions Allowed     Route To Pharmacy - [facility]   Signed by Cely Castellano CMA              ** Patient matched by Cely Castellano CMA on 3/23/2020 2:39:14 PM CDT **      ------------------------------------------  From: [facility]  To: Bala Bob MD  Sent: March 23, 2020 10:02:22 AM CDT  Subject: Medication Management  Due: March 24, 2020 10:02:22 AM CDT    ** On Hold Pending Signature **  Drug: hydroCHLOROthiazide (hydroCHLOROthiazide 25 mg oral tablet)  TAKE 1 TABLET BY MOUTH ONCE  DAILY  Quantity: 90 unknown unit  Days Supply: 0  Refills: 0  Substitutions Allowed  Notes from Pharmacy:     Dispensed Drug: hydroCHLOROthiazide (hydroCHLOROthiazide 25 mg oral tablet)  TAKE 1 TABLET BY MOUTH ONCE DAILY  Quantity: 90 unknown unit  Days Supply: 0  Refills: 0  Substitutions Allowed  Notes from Pharmacy:     ** On Hold Pending Signature **  Drug: losartan (losartan 100 mg oral tablet)  TAKE 1 TABLET BY MOUTH ONCE DAILY  Quantity: 90 unknown unit  Days Supply: 0  Refills: 0  Substitutions Allowed  Notes from Pharmacy:     Dispensed Drug: losartan (losartan 100 mg oral tablet)  TAKE 1 TABLET BY MOUTH ONCE DAILY  Quantity: 90 unknown unit  Days Supply: 0  Refills: 0  Substitutions Allowed  Notes from Pharmacy:     ** On Hold Pending Signature **  Drug: atenolol (atenolol 100 mg oral tablet)  TAKE 1 TABLET BY MOUTH ONCE DAILY  Quantity: 90 unknown unit  Days Supply: 0  Refills: 0  Substitutions Allowed  Notes from Pharmacy:     Dispensed Drug: atenolol (atenolol 100 mg oral tablet)  TAKE 1 TABLET BY MOUTH ONCE DAILY  Quantity: 90 unknown unit  Days Supply: 0  Refills: 0  Substitutions Allowed  Notes from Pharmacy:     ** On Hold Pending Signature **  Drug: levothyroxine (levothyroxine 88 mcg (0.088 mg) oral tablet)  TAKE 1 TABLET BY MOUTH ONCE DAILY  Quantity: 90 unknown unit  Days Supply: 0  Refills: 0  Substitutions Allowed  Notes from Pharmacy:     Dispensed Drug: levothyroxine (levothyroxine 88 mcg (0.088 mg) oral tablet)  TAKE 1 TABLET BY MOUTH ONCE DAILY  Quantity: 90 unknown unit  Days Supply: 0  Refills: 0  Substitutions Allowed  Notes from Pharmacy:     ** On Hold Pending Signature **  Drug: amLODIPine (amLODIPine 5 mg oral tablet)  TAKE 1 TABLET BY MOUTH ONCE DAILY  Quantity: 90 unknown unit  Days Supply: 0  Refills: 0  Substitutions Allowed  Notes from Pharmacy:     Dispensed Drug: amLODIPine (amLODIPine 5 mg oral tablet)  TAKE 1 TABLET BY MOUTH ONCE DAILY  Quantity: 90 unknown unit  Days  Supply: 0  Refills: 0  Substitutions Allowed  Notes from Pharmacy:   ------------------------------------------Med Refill      Date of last office visit and reason:  3/18/19      Date of last Med Check / Px:   3/18/19    RTC order in chart:  yes due px. Refilled 3 months to get to July when we start scheduling physicals.    For Protocol refill, has patient been contacted:  _

## 2022-02-16 NOTE — PROGRESS NOTES
MAURICIO VERMA :  1927  10/02/2020 MRN: [id number]  [facility] Visit  S: The resident was hospitalized at [facility] from  to 2020 with acute MI and stroke.  She was discharged to Hospice with appropriate medications.     As I see her she has a trach and is not able to speak above a whisper; I can understand a few words.  She had a rosary and seemed to be asking me to pray with her.  She denied pain.   O: On physical exam she does not look uncomfortable.  Trach is open and exchanging air easily.  Chest is clear.  Cardiac exam is regular.  Extremities have no edema.  She moves all four extremities.  I do not appreciate facial asymmetry.   A/P: End of life care for a 89-year-old woman with recent stroke and acute MI.   D:  10/02/2020  T:  10/05/2020 Bala Bob MD, FACP/sq    c:  [facility]

## 2022-02-16 NOTE — PROGRESS NOTES
Patient:   MAURICIO VERMA            MRN: [id number]            FIN: [id number]               Age:   89 years     Sex:  Female     :  1927   Associated Diagnoses:   Osteoporosis; Medicare annual wellness visit, subsequent; HTN (hypertension); HLD (Hyperlipidemia); ACQUIRED HYPOTHYROIDISM   Author:   Bala Bob MD      Visit Information   Visit type:  Annual exam.    Accompanied by:  Family member.    Source of history:  Self.    History limitation:  None.       Chief Complaint   12/15/2017 9:58 AM CST   AWV      History of Present Illness   This patient is here for a wellness visit.    She continues to live in her own apartment and is basically happy with her life.  She gets a little lonely.  She is  and many of her friends are gone.  She has her sister with her.    Discussed with her her history of osteoporosis and obtaining a bone density and treating osteoporosis.  She doesn t wish to pursue that.     She uses a cane some of the time.  She has not had any falls.    On the health risk assessment form, there are no positives.    Complete review of systems reveals she occasionally feels a little short of breath but it is not predictable.  It is not necessarily with activity and never with lying down.  It is not bothersome enough to her that she wishes to work it up at this time.    PHQ-9 score is 4.  She does score a 2 each for anhedonia and depressed mood.  I discussed this in some detail.  She does not wish to do anything about it - she does not want to take an antidepressant or see a psychologist.              Review of Systems   See HPI       Health Status   Allergies:    Allergic Reactions (Selected)  No known allergies   Medications:  (Selected)   Prescriptions  Prescribed  amLODIPine 5 mg oral tablet: 1 tab(s) ( 5 mg ), po, daily, # 90 tab(s), 3 Refill(s), Type: Maintenance, Pharmacy: [facility], 1 tab(s) po daily  atenolol 100 mg oral tablet: 1 tab(s) ( 100 mg ), po, daily, # 90 tab(s),  3 Refill(s), Type: Maintenance, Pharmacy: [facility], 1 tab(s) po daily  hydrochlorothiazide-losartan 25 mg-100 mg oral tablet: 1 tab(s), po, daily, # 90 tab(s), 3 Refill(s), Type: Maintenance, Pharmacy: [facility], 1 tab(s) po daily  levothyroxine 88 mcg (0.088 mg) oral capsule: 1 cap(s) ( 88 mcg ), po, daily, # 90 cap(s), 3 Refill(s), Type: Maintenance, Pharmacy: [facility], 1 cap(s) po daily  trach-mate tube duarte: trach-mate tube duarte, See Instructions, Instructions: Daily as needed, Supply, # 100 EA, 3 Refill(s), Type: Maintenance, Pharmacy: [facility], Daily as needed   Problem list:    All Problems  VV (Varicose Veins) / ICD-9-.9 / Confirmed  Rhinitis, chronic / ICD-9-.0 / Confirmed  RA (Rheumatoid Arthritis) / ICD-9-.0 / Confirmed  Paralysis of vocal cords or larynx, unspecified / SNOMED CT 029012786 / Confirmed  Osteoporosis / ICD-9-.00 / Confirmed  Nodular Goiter / ICD-9-.9 / Confirmed  Menopause / ICD-9-.2 / Confirmed  Meningioma / ICD-9-.2 / Confirmed  Lower Extremity Edema / ICD-9-.3 / Confirmed  Left ventricular dysfunction with reduced left ventricular function / ICD-9-.9 / Confirmed  HTN (hypertension) / SNOMED CT 6325171771 / Confirmed  Hx of Colonic Polyps / ICD-9-CM V12.72 / Confirmed  HLD (Hyperlipidemia) / ICD-9-.4 / Confirmed  Hip Joint Pain / ICD-9-.45 / Confirmed  Chronic Back Pain / ICD-9-.5 / Confirmed  Breast Cancer / ICD-9-.9 / Confirmed  ACQUIRED HYPOTHYROIDISM / ICD-9- / Confirmed  Inactive: Hyperparathyroidism / ICD-9-.00  Canceled: CAD (Coronary Artery Disease) / ICD-9-.00      Histories   Past Medical History:    Active  Breast Cancer (174.9): Onset in the month of 10/1994 at 67 years  Meningioma (225.2): Onset in 1980 at 52 years.  RA (Rheumatoid Arthritis) (714.0)  HTN (hypertension) (4684831439)  Menopause (627.2)  HLD (Hyperlipidemia) (272.4)  Osteoporosis (733.00)  Hx of  Colonic Polyps (V12.72)  VV (Varicose Veins) (454.9)  Nodular Goiter (241.9)  Paralysis of vocal cords or larynx, unspecified (456948962)  Chronic Back Pain (724.5)  Hip Joint Pain (719.45)  Lower Extremity Edema (782.3)  Comments:  8/10/2010 CDT 5:09 PM CDT - Hodan Reynaga  chronic  ACQUIRED HYPOTHYROIDISM (244)   Family History:    Heart disease  Father ()  Pancreatic cancer  Mother ()  Alcohol abuse  Father ()     Procedure history:    Normal Coronary Angiogram on 2008 at 80 Years.  Tracheostomy (69297288) in the month of 10/2006 at 79 Years.  Comments:  8/10/2010 5:07 PM - Hodan Reynaga  following complications of thyroid surgery and vocal cord paralysis.  Total thyroidectomy (78374768) in  at 78 Years.  Comments:  8/10/2010 5:03 PM - Hodan Reynaga  with excision of left parathyroid adenoma  Colonoscopy (010286737) in  at 73 Years.  Colonoscopy (298464670) in the month of 1998 at 70 Years.  Comments:  8/10/2010 5:01 PM - Hodan Reynaga  with polypectomy - adenomatous  Excision of lesion of breast (9319517083) in  at 67 Years.  Comments:  8/10/2010 5:13 PM - Hodan Reynaga.  Meningioma (32661418) in  at 53 Years.  Appendectomy (195290904) in  at 50 Years.  Comments:  8/10/2010 4:59 PM - Hodan Reynaga  ruptured appendix  Tonsillectomy (593145616) in 1933 at 6 Years.  Mastectomy (1334789227).  Comments:  8/10/2010 5:14 PM - Hodan Reynaga   Social History:        Alcohol Assessment: Current            Current                     Comments:                      08/10/2010 - Hodan Reynaga                     rarely      Tobacco Assessment: Denies Tobacco Use            Never      Employment and Education Assessment            Retired      Home and Environment Assessment            Marital status: .                     Comments:                      2014 - Bala Bob MD                      2014        Physical Examination   Vital  Signs   12/15/2017 10:11 AM CST Systolic Blood Pressure 130 mmHg    Diastolic Blood Pressure 64 mmHg    Mean Arterial Pressure 86 mmHg    BP Site Right arm   12/15/2017 9:58 AM CST Peripheral Pulse Rate 71 bpm    Systolic Blood Pressure 146 mmHg  HI    Diastolic Blood Pressure 66 mmHg    Mean Arterial Pressure 93 mmHg    BP Site Right arm      Measurements from flowsheet : Measurements   12/15/2017 9:58 AM CST Height Measured - Standard 62 in    Weight Measured - Standard 121 lb    BSA 1.55 m2    Body Mass Index 22.13 kg/m2      General:  Alert and oriented, No acute distress.    Eye:  Normal conjunctiva.    HENT:  Normocephalic, Oral mucosa is moist, No pharyngeal erythema, hearing aides.    Neck:  Supple, Non-tender, No carotid bruit, No lymphadenopathy, No thyromegaly, Trach.    Respiratory:  Lungs are clear to auscultation, Respirations are non-labored.    Cardiovascular:  Normal rate, Regular rhythm, No murmur, No gallop, Normal peripheral perfusion, No pitting edema.    Gastrointestinal:  Soft, Non-tender.    Musculoskeletal:  No deformity, Normal gait.    Integumentary:  No pallor.    Neurologic:  No focal deficits.    Cognition and Speech:  Speech clear and coherent, Functional cognition intact.    Psychiatric:  Cooperative, Appropriate mood & affect.       Review / Management   Results review:  Lab results   12/6/2017 8:24 AM CST Sodium Level 142 mmol/L    Potassium Level 4.1 mmol/L    Chloride Level 103 mmol/L    CO2 Level 30 mmol/L    Glucose Level 93 mg/dL    BUN 24 mg/dL    Creatinine Level 1.06 mg/dL  HI    BUN/Creat Ratio 23  HI    eGFR 46 mL/min/1.73m2  LOW    eGFR African American 54 mL/min/1.73m2  LOW    Calcium Level 8.9 mg/dL    Cholesterol 215 mg/dL  HI    Non-HDL Cholesterol 157  HI    HDL 58 mg/dL    Cholesterol/HDL Ratio 3.7      HI    Triglyceride 138 mg/dL    TSH 4.53 mIU/L  HI   11/16/2016 11:10 AM CST Sodium Level 142 mmol/L    Potassium Level 4.3 mmol/L    Chloride Level 103  mmol/L    CO2 Level 27 mmol/L    Glucose Level 94 mg/dL    BUN 24 mg/dL    Creatinine Level 1.01 mg/dL  HI    BUN/Creat Ratio 24  HI    eGFR 49 mL/min/1.73m2  LOW    eGFR African American 57 mL/min/1.73m2  LOW    Calcium Level 9.2 mg/dL    Cholesterol 160 mg/dL    Non-HDL Cholesterol 100    HDL 60 mg/dL    Cholesterol/HDL Ratio 2.7    LDL 77    Triglyceride 117 mg/dL    TSH 3.11 mIU/L   11/16/2015 12:15 PM CST Sodium Level 143 mmol/L    Potassium Level 3.8 mmol/L    Chloride Level 103 mmol/L    CO2 Level 26 mmol/L    Glucose Level 95 mg/dL    BUN 25 mg/dL    Creatinine Level 1.04 mg/dL  HI    BUN/Creat Ratio 24  HI    eGFR 48 mL/min/1.73m2  LOW    eGFR African American 56 mL/min/1.73m2  LOW    Calcium Level 8.9 mg/dL    Cholesterol 174 mg/dL    Non-HDL Cholesterol 112    HDL 62 mg/dL    Cholesterol/HDL Ratio 2.8    LDL 89    Triglyceride 113 mg/dL   6/2/2015 10:56 AM CDT TSH 1.15 mIU/L   3/4/2015 10:39 AM CST TSH 0.02 mIU/L  LOW   1/16/2015 3:51 PM CST TSH 0.02 mIU/L  LOW   11/25/2014 1:48 PM CST Cholesterol 165 mg/dL    Non-HDL Cholesterol 103    HDL 62 mg/dL    Cholesterol/HDL Ratio 2.7    LDL 70    Triglyceride 163 mg/dL  HI    TSH 5.28 mIU/L  HI   9/22/2014 2:13 PM CDT Sodium Level 144 mmol/L    Potassium Level 3.8 mmol/L    Chloride Level 103 mmol/L    CO2 Level 29 mmol/L    Glucose Level 132 mg/dL  HI    BUN 20 mg/dL    Creatinine Level 1.11 mg/dL  HI    BUN/Creat Ratio 18    eGFR 45 mL/min/1.73m2  LOW    eGFR African American 52 mL/min/1.73m2  LOW    Calcium Level 9.5 mg/dL    TSH 38.46 mIU/L  HI    B-Natriuretic Peptide (BNP) 56 pg/mL    WBC 8.1    RBC 4.46    Hgb 14.6 gm/dL    Hct 43.7 %    MCV 97.9 fL    MCH 32.8 pg    MCHC 33.5 gm/dL    RDW 14.7 %    Platelet 220   .    Radiology results   X-ray, PA and lat chest , Reported at  9/22/2014 2:52:00 PM, Reveals no acute disease process   ECG interpretation:  Time  9/22/2014 2:52:00 PM, Rate  87  beats per minute.         Ventricular dysrhythmia: Premature  ventricular contraction/s ( Multifocal ).       Impression and Plan   Diagnosis     Osteoporosis (BOY85-QO M81.0).     Medicare annual wellness visit, subsequent (XXE96-VU Z00.00).     HTN (hypertension) (RGL13-PZ I10).     HLD (Hyperlipidemia) (NDH00-KO E78.5).     ACQUIRED HYPOTHYROIDISM (IIB12-EG E03.9).     Course:  Progressing as expected.    Patient Instructions:       Counseled: Patient, Diet, Activity, Verbalized understanding.    Orders     Orders (Selected)   Outpatient Orders  Ordered  Return to Clinic (Request): RFV: Annual Exam, Return in December 2018, Instructions: BMP and TSH before visit  Completed  influenza virus vaccine, inactivated high-dose preservative-free trivalent intramuscular suspension...: 0.5 mL, im, once  Prescriptions  Prescribed  amLODIPine 5 mg oral tablet: 1 tab(s) ( 5 mg ), po, daily, # 90 tab(s), 3 Refill(s), Type: Maintenance, Pharmacy: [facility], 1 tab(s) po daily  atenolol 100 mg oral tablet: 1 tab(s) ( 100 mg ), po, daily, # 90 tab(s), 3 Refill(s), Type: Maintenance, Pharmacy: [facility], 1 tab(s) po daily  hydrochlorothiazide-losartan 25 mg-100 mg oral tablet: 1 tab(s), po, daily, # 90 tab(s), 3 Refill(s), Type: Maintenance, Pharmacy: [facility], 1 tab(s) po daily  levothyroxine 88 mcg (0.088 mg) oral capsule: 1 cap(s) ( 88 mcg ), po, daily, # 90 cap(s), 3 Refill(s), Type: Maintenance, Pharmacy: [facility], 1 cap(s) po daily  trach-mate tube duarte: trach-mate tube duarte, See Instructions, Instructions: Daily as needed, Supply, # 100 EA, 3 Refill(s), Type: Maintenance, Pharmacy: [facility], Daily as needed.     Diagnosis     Loneliness.     Course:  Improving: none.    Orders     Discussed the need to get out and resources available.

## 2022-02-16 NOTE — TELEPHONE ENCOUNTER
Entered by Debbie Andino CMA on June 28, 2020 10:47:12 AM CDT  Date of last office visit and reason:  3/18/2019 px      Date of last Med Check / Px:   3/18/2019    RTC order in chart:  placed- due now for annual    For Protocol refill, has patient been contacted:  _          ** Submitted: **  Order:amLODIPine (amLODIPine 5 mg oral tablet)  See Instructions  TAKE 1 TABLET BY MOUTH ONCE DAILY  Qty:  30 tab(s)        Refills:  0          Substitutions Allowed     Route To Pharmacy - [facility]    Signed by Debbie Andino CMA  6/28/2020 3:39:00 PM [facility]    ** Submitted: **  Complete:amLODIPine (amLODIPine 5 mg oral tablet)   Signed by Debbie Andino CMA  6/28/2020 3:39:00 PM [facility]    ** Not Approved:  **  amLODIPine (AMLODIPINE 5MG TABLET)  TAKE 1 TABLET BY MOUTH ONCE DAILY  Qty:  90 unknown unit        Days Supply:  0        Refills:  0          Substitutions Allowed     Route To Pharmacy - [facility]   Signed by Debbie Andino CMA            ** Patient matched by Debbie Andino CMA on 6/28/2020 10:37:52 AM CDT **      ------------------------------------------  From: "[employer]  To: Bala Bob MD  Sent: June 26, 2020 10:55:25 AM CDT  Subject: Medication Management  Due: June 24, 2020 10:12:23 PM CDT     ** On Hold Pending Signature **     Dispensed Drug: amLODIPine (amLODIPine 5 mg oral tablet), TAKE 1 TABLET BY MOUTH ONCE DAILY  Quantity: 90 unknown unit  Days Supply: 0  Refills: 0  Substitutions Allowed  Notes from Pharmacy:     ** On Hold Pending Signature **     Dispensed Drug: atenolol (atenolol 100 mg oral tablet), TAKE 1 TABLET BY MOUTH ONCE DAILY  Quantity: 90 unknown unit  Days Supply: 0  Refills: 0  Substitutions Allowed  Notes from Pharmacy:     ** On Hold Pending Signature **     Dispensed Drug: hydroCHLOROthiazide (hydroCHLOROthiazide 25 mg oral tablet), TAKE 1 TABLET BY MOUTH ONCE DAILY  Quantity: 90 unknown unit  Days Supply: 0  Refills: 0  Substitutions Allowed  Notes from Pharmacy:     **  On Hold Pending Signature **     Dispensed Drug: levothyroxine (levothyroxine 88 mcg (0.088 mg) oral tablet), TAKE 1 TABLET BY MOUTH ONCE DAILY  Quantity: 90 unknown unit  Days Supply: 0  Refills: 0  Substitutions Allowed  Notes from Pharmacy:     ** On Hold Pending Signature **     Dispensed Drug: losartan (losartan 100 mg oral tablet), TAKE 1 TABLET BY MOUTH ONCE DAILY  Quantity: 90 unknown unit  Days Supply: 0  Refills: 0  Substitutions Allowed  Notes from Pharmacy:  ------------------------------------------

## 2022-02-16 NOTE — TELEPHONE ENCOUNTER
---------------------  From: Kimberly Dey LPN (Phone Messages Pool ([address] [place] [address]))   To: [facility] Message Pool ([address] [place] [address]);     Sent: 9/30/2020 10:20:24 AM CDT  Subject: Kinnic f/u appt     Phone Message    PCP:   FOX      Time of Call:  9:13am       Person Calling:  Kwame Mcgowan  Phone number:  [phone]    Returned call at: 10:14am    Note:   Nelida LM stating pt just got to them yesterday. Nelida says they have to get pt in for her 3 day follow up per their protocol.    Nelida says pt is on comfort cares and is not very responsive. Nelida says instead of them loading pt up to bring her over here she is wondering if GAGEDL could do a video check up or go over to the [facility] to do his assessment.    Returned call and informed Nelida FOX out of clinic today and message will be forwarded or tomorrow. She says if [facility]DL can just come over to them it will likely just take a few minutes for him to look at her.    Last office visit and reason:  _---------------------  From: Vanesa Chaudhry ([facility] Message Pool ([address] [place] [address]))   To: Bala Bob MD;     Sent: 10/1/2020 9:27:04 AM CDT  Subject: FW: Kinnic f/u appt---------------------  From: Bala Bob MD   To: [facility] Message Pool ([address] [place] [address]);     Sent: 10/1/2020 10:14:38 AM CDT  Subject: RE: Kinnic f/u appt     Can rounding PA/NP see her?---------------------  From: Vanesa Chaudhry ([facility] Message Pool ([address] [place] [address]))   To: Bala Bob MD;     Sent: 10/1/2020 10:53:56 AM CDT  Subject: FW: Kinnic f/u appt     I do not believe they are doing normal rounds like they use to do to COVID. ok to schedule phone/ video visit? ?We are booked today and tomorrow. Can we add pt in?---------------------  From: Bala Bob MD   To: [facility] Message Pool ([id number]);     Sent: 10/1/2020 11:04:06 AM CDT  Subject: RE: Terrencenic f/u appt     I will stop by and see her. Please fax current orders for my review.Spoke to Nelida  so will be faxing order over ASPAP---------------------  From: Lupe/Vanesa DUNCAN (J Message Pool ([id number]))   To: Paulino GRADY Plato;     Sent: 10/1/2020 11:25:00 AM CDT  Subject: FW: Juan M f/fabricio appt

## 2022-02-16 NOTE — TELEPHONE ENCOUNTER
Entered by Bárbara Norwood CMA on January 02, 2019 10:52:47 AM CST  ---------------------  From: Bárbara oNrwood CMA   To: [facility]    Sent: 1/2/2019 10:52:47 AM CST  Subject: Medication Management     ** Submitted: **  Order:amLODIPine (amLODIPine 5 mg oral tablet)  1 tab(s)  Oral  daily  Qty:  30 tab(s)        Refills:  0          Substitutions Allowed     Route To Pharmacy - [facility]    Signed by Bárbara Norwood CMA  1/2/2019 10:52:00 AM    ** Submitted: **  Complete:amLODIPine (amLODIPine 5 mg oral tablet)   Signed by Bárbara Norwood CMA  1/2/2019 10:52:00 AM    ** Not Approved:  **  amLODIPine (AMLODIPINE 5MG TABLET)  TAKE ONE TABLET BY MOUTH ONCE DAILY  Qty:  90 unknown unit        Days Supply:  0        Refills:  0          MIMI     Route To Pharmacy - [facility]   Signed by Bárbara Norwood CMA            ** Submitted: **  Order:atenolol (atenolol 100 mg oral tablet)  1 tab(s)  Oral  daily  Qty:  30 tab(s)        Refills:  0          Substitutions Allowed     Route To Pharmacy - [facility]    Signed by Bárbara Norwood CMA  1/2/2019 10:51:00 AM    ** Submitted: **  Complete:atenolol (atenolol 100 mg oral tablet)   Signed by Bárbara Norwood CMA  1/2/2019 10:52:00 AM    ** Not Approved:  **  atenolol (ATENOLOL     TAB 100MG TABLET)  TAKE ONE TABLET BY MOUTH ONCE DAILY  Qty:  90 unknown unit        Days Supply:  0        Refills:  0          MIMI     Route To Pharmacy - [facility]   Signed by Bárbara Norwood CMA            ** Submitted: **  Order:hydrochlorothiazide-losartan (hydrochlorothiazide-losartan 25 mg-100 mg oral tablet)  1 tab(s)  Oral  daily  Qty:  30 tab(s)        Refills:  0          Substitutions Allowed     Route To Pharmacy - [facility]    Signed by Bárbara Norwood CMA  1/2/2019 10:51:00 AM    ** Submitted: **  Complete:hydrochlorothiazide-losartan (hydrochlorothiazide-losartan 25 mg-100 mg oral tablet)   Signed by Bárbara Norwood CMA  1/2/2019 10:51:00 AM    ** Not Approved:  **  hydrochlorothiazide-losartan  (LOSARTAN/HCTZ 100MG/25MG /25 TABLET)  TAKE ONE TABLET BY MOUTH ONCE DAILY  Qty:  90 unknown unit        Days Supply:  0        Refills:  0          MIMI     Route To [facility]   Signed by Bárbara Norwood CMA            ** Submitted: **  Order:levothyroxine (levothyroxine 88 mcg (0.088 mg) oral tablet)  1 tab(s)  Oral  daily  Qty:  30 tab(s)        Refills:  0          Substitutions Allowed     Route To [facility]    Signed by Bárbara Norwood CMA  1/2/2019 10:51:00 AM    ** Submitted: **  Complete:levothyroxine (levothyroxine 88 mcg (0.088 mg) oral capsule)   Signed by Bárbara Norwood CMA  1/2/2019 10:51:00 AM    ** Not Approved:  **  levothyroxine (LEVOTHYROXINE TAB 88MCG TABLET)  TAKE ONE TABLET BY MOUTH ONCE DAILY  Qty:  90 unknown unit        Days Supply:  0        Refills:  0          MIMI     Route To [facility]   Signed by Bárbara Norwood CMA            ** Patient matched by Bárbara Norwood CMA on 1/2/2019 10:49:28 AM CST **      ------------------------------------------  From: [place] [facility]  To: Bala Bob MD  Sent: January 2, 2019 9:04:32 AM CST  Subject: Medication Management  Due: January 3, 2019 9:04:32 AM CST    ** On Hold Pending Signature **  Drug: atenolol (atenolol 100 mg oral tablet)  TAKE ONE TABLET BY MOUTH ONCE DAILY  Quantity: 90 unknown unit  Days Supply: 0         Refills: 0  Substitutions Allowed  Notes from Pharmacy:     Dispensed Drug: atenolol (atenolol 100 mg oral tablet)  TAKE ONE TABLET BY MOUTH ONCE DAILY  Quantity: 90 unknown unit  Days Supply: 0         Refills: 0  Substitutions Allowed  Notes from Pharmacy:     ** On Hold Pending Signature **  Drug: amLODIPine (amLODIPine 5 mg oral tablet)  TAKE ONE TABLET BY MOUTH ONCE DAILY  Quantity: 90 unknown unit  Days Supply: 0         Refills: 0  Substitutions Allowed  Notes from Pharmacy:     Dispensed Drug: amLODIPine (amLODIPine 5 mg oral tablet)  TAKE ONE TABLET BY MOUTH ONCE DAILY  Quantity: 90  unknown unit  Days Supply: 0         Refills: 0  Substitutions Allowed  Notes from Pharmacy:     ** On Hold Pending Signature **  Drug: hydrochlorothiazide-losartan (hydrochlorothiazide-losartan 25 mg-100 mg oral tablet)  TAKE ONE TABLET BY MOUTH ONCE DAILY  Quantity: 90 unknown unit  Days Supply: 0         Refills: 0  Substitutions Allowed  Notes from Pharmacy:     Dispensed Drug: hydrochlorothiazide-losartan (hydrochlorothiazide-losartan 25 mg-100 mg oral tablet)  TAKE ONE TABLET BY MOUTH ONCE DAILY  Quantity: 90 unknown unit  Days Supply: 0         Refills: 0  Substitutions Allowed  Notes from Pharmacy:     ** On Hold Pending Signature **  Drug: levothyroxine (levothyroxine 88 mcg (0.088 mg) oral tablet)  TAKE ONE TABLET BY MOUTH ONCE DAILY  Quantity: 90 unknown unit  Days Supply: 0         Refills: 0  Substitutions Allowed  Notes from Pharmacy:     Dispensed Drug: levothyroxine (levothyroxine 88 mcg (0.088 mg) oral tablet)  TAKE ONE TABLET BY MOUTH ONCE DAILY  Quantity: 90 unknown unit  Days Supply: 0         Refills: 0  Substitutions Allowed  Notes from Pharmacy:   ------------------------------------------Med Refill      Date of last office visit and reason:  12/15/17      Date of last Med Check / Px:   12/15/17  Date of last labs pertaining to med:  12/6/17    RTC order in chart:  yes    For Protocol refill, has patient been contacted:  n/a - appt scheduled

## 2022-02-16 NOTE — TELEPHONE ENCOUNTER
Entered by Vanesa Chaudhry on July 08, 2020 11:24:46 AM CDT  PCP:  [id number]    Medication:   Amlodipine, Atenolol, HCTZ, Levothyroxine   Last Filled:  6/28/20   Quantity:  30 Refills:  _    Date of last office visit and reason:   3/18/19 AWV  Date of last labs pertaining to condition:  _    Note:  Patient is due for AWV. Called pt at 1123, phone kept ringing. Please advise if ok to give refills. Protocol fill already given.     Return to Clinic order placed?  Yes.    Resource:   _  Phone:   _            ** Patient matched by Vanesa Chaudhry on 7/8/2020 11:20:30 AM CDT **      ------------------------------------------  From: [facility]  To: Bala Bob MD  Sent: July 7, 2020 3:10:48 PM CDT  Subject: Medication Management  Due: June 24, 2020 10:20:04 PM CDT     ** On Hold Pending Signature **     Dispensed Drug: levothyroxine (levothyroxine 88 mcg (0.088 mg) oral tablet), TAKE 1 TABLET BY MOUTH ONCE DAILY  Quantity: 30 unknown unit  Days Supply: 0  Refills: 0  Substitutions Allowed  Notes from Pharmacy:     ** On Hold Pending Signature **     Dispensed Drug: atenolol (atenolol 100 mg oral tablet), TAKE 1 TABLET BY MOUTH ONCE DAILY  Quantity: 30 unknown unit  Days Supply: 0  Refills: 0  Substitutions Allowed  Notes from Pharmacy:     ** On Hold Pending Signature **     Dispensed Drug: hydroCHLOROthiazide (hydroCHLOROthiazide 25 mg oral tablet), TAKE 1 TABLET BY MOUTH ONCE DAILY  Quantity: 30 unknown unit  Days Supply: 0  Refills: 0  Substitutions Allowed  Notes from Pharmacy:     ** On Hold Pending Signature **     Dispensed Drug: amLODIPine (amLODIPine 5 mg oral tablet), TAKE 1 TABLET BY MOUTH ONCE DAILY  Quantity: 30 unknown unit  Days Supply: 0  Refills: 0  Substitutions Allowed  Notes from Pharmacy:  ---------------------------------------------------------------  From: Lu[facility]/Vanesa [facility]UNC[facility] ([facility] ([id number] - [place]))   To: Bala Bob MD;     Sent: 7/8/2020 11:24:51 AM CDT  Subject: FW:  Medication Management   Due Date/Time: 7/8/2020 3:10:00 PM CDT  ** Submitted: **  Complete:amLODIPine (amLODIPine 5 mg oral tablet)   Signed by Bala Bob MD  7/9/2020 5:56:00 PM [facility]    ** Submitted: **  Complete:atenolol (atenolol 100 mg oral tablet)   Signed by Bala Bob MD  7/9/2020 5:56:00 PM [facility]    ** Submitted: **  Complete:levothyroxine (levothyroxine 88 mcg (0.088 mg) oral tablet)   Signed by Bala Bob MD  7/9/2020 5:56:00 PM [facility]    ** Approved with modifications: **  levothyroxine (LEVOTHYROXINE TAB 88MCG TABLET)  TAKE 1 TABLET BY MOUTH ONCE DAILY  Qty:  30 unknown unit        Days Supply:  0        Refills:  11          Substitutions Allowed     Route To Pharmacy - [facility]       ** Approved with modifications: **  atenolol (ATENOLOL     TAB 100MG TABLET)  TAKE 1 TABLET BY MOUTH ONCE DAILY  Qty:  30 unknown unit        Days Supply:  0        Refills:  11          Substitutions Allowed     Route To Pharmacy - [facility]       ** Approved with modifications: **  amLODIPine (AMLODIPINE 5MG TABLET)  TAKE 1 TABLET BY MOUTH ONCE DAILY  Qty:  30 unknown unit        Days Supply:  0        Refills:  11          Substitutions Allowed     Route To Pharmacy - [facility]---------------------  From: Bala Bob MD   To: [facility]    Sent: 7/9/2020 12:57:02 PM CDT  Subject: FW: Medication Management     ** Submitted: **  Complete:hydroCHLOROthiazide (hydroCHLOROthiazide 25 mg oral tablet)   Signed by Bala Bob MD  7/9/2020 5:57:00 PM [facility]    ** Approved with modifications: **  hydroCHLOROthiazide (HYDROCHLOROTHIAZIDE 25MG TABLET)  TAKE 1 TABLET BY MOUTH ONCE DAILY  Qty:  30 unknown unit        Days Supply:  0        Refills:  11          Substitutions Allowed     Route To Pharmacy - [facility]